# Patient Record
Sex: FEMALE | Race: WHITE | NOT HISPANIC OR LATINO | URBAN - METROPOLITAN AREA
[De-identification: names, ages, dates, MRNs, and addresses within clinical notes are randomized per-mention and may not be internally consistent; named-entity substitution may affect disease eponyms.]

---

## 2017-11-01 ENCOUNTER — APPOINTMENT (RX ONLY)
Dept: URBAN - METROPOLITAN AREA CLINIC 15 | Facility: CLINIC | Age: 42
Setting detail: DERMATOLOGY
End: 2017-11-01

## 2017-11-01 DIAGNOSIS — Z41.9 ENCOUNTER FOR PROCEDURE FOR PURPOSES OTHER THAN REMEDYING HEALTH STATE, UNSPECIFIED: ICD-10-CM

## 2017-11-01 PROBLEM — E78.5 HYPERLIPIDEMIA, UNSPECIFIED: Status: ACTIVE | Noted: 2017-11-01

## 2017-11-01 PROBLEM — F41.9 ANXIETY DISORDER, UNSPECIFIED: Status: ACTIVE | Noted: 2017-11-01

## 2017-11-01 PROBLEM — L55.1 SUNBURN OF SECOND DEGREE: Status: ACTIVE | Noted: 2017-11-01

## 2017-11-01 PROCEDURE — ? BOTOX

## 2017-11-01 PROCEDURE — ? ADDITIONAL NOTES

## 2017-11-01 ASSESSMENT — LOCATION DETAILED DESCRIPTION DERM
LOCATION DETAILED: LEFT SUPERIOR LATERAL MALAR CHEEK
LOCATION DETAILED: RIGHT FOREHEAD
LOCATION DETAILED: RIGHT SUPERIOR CENTRAL MALAR CHEEK
LOCATION DETAILED: LEFT FOREHEAD
LOCATION DETAILED: RIGHT SUPERIOR FOREHEAD
LOCATION DETAILED: INFERIOR MID FOREHEAD
LOCATION DETAILED: RIGHT LATERAL CANTHUS
LOCATION DETAILED: LEFT CENTRAL EYEBROW
LOCATION DETAILED: LEFT SUPERIOR CENTRAL MALAR CHEEK
LOCATION DETAILED: GLABELLA
LOCATION DETAILED: LEFT INFERIOR TEMPLE
LOCATION DETAILED: RIGHT INFERIOR FOREHEAD
LOCATION DETAILED: RIGHT LATERAL FOREHEAD
LOCATION DETAILED: LEFT LATERAL FOREHEAD
LOCATION DETAILED: LEFT MEDIAL FOREHEAD
LOCATION DETAILED: LEFT SUPERIOR LATERAL FOREHEAD

## 2017-11-01 ASSESSMENT — LOCATION ZONE DERM
LOCATION ZONE: EYELID
LOCATION ZONE: FACE

## 2017-11-01 ASSESSMENT — LOCATION SIMPLE DESCRIPTION DERM
LOCATION SIMPLE: GLABELLA
LOCATION SIMPLE: RIGHT FOREHEAD
LOCATION SIMPLE: RIGHT CHEEK
LOCATION SIMPLE: RIGHT EYELID
LOCATION SIMPLE: LEFT EYEBROW
LOCATION SIMPLE: LEFT TEMPLE
LOCATION SIMPLE: LEFT FOREHEAD
LOCATION SIMPLE: INFERIOR FOREHEAD
LOCATION SIMPLE: LEFT CHEEK

## 2017-11-01 NOTE — PROCEDURE: BOTOX
[place] Units: 0
Detail Level: Zone
Post-Care Instructions: Patient instructed to not lie down for 4 hours and limit physical activity for 24 hours. Patient instructed not to travel by airplane for 48 hours.
Consent: Written consent obtained. Risks include but not limited to lid/brow ptosis, bruising, swelling, diplopia, temporary effect, incomplete chemical denervation.
Lot #: [id number]
Forehead Units: 22.5
Dilution (U/0.1 Cc): 2.5
Expiration Date (Month Year): March 2020
Additional Area 1 Location: upper lip

## 2017-12-11 ENCOUNTER — APPOINTMENT (RX ONLY)
Dept: URBAN - METROPOLITAN AREA CLINIC 15 | Facility: CLINIC | Age: 42
Setting detail: DERMATOLOGY
End: 2017-12-11

## 2017-12-11 DIAGNOSIS — Z41.9 ENCOUNTER FOR PROCEDURE FOR PURPOSES OTHER THAN REMEDYING HEALTH STATE, UNSPECIFIED: ICD-10-CM

## 2017-12-11 PROCEDURE — ? ADDITIONAL NOTES

## 2017-12-11 PROCEDURE — ? BOTOX

## 2017-12-11 ASSESSMENT — LOCATION DETAILED DESCRIPTION DERM
LOCATION DETAILED: RIGHT FOREHEAD
LOCATION DETAILED: LEFT INFERIOR FOREHEAD
LOCATION DETAILED: RIGHT INFERIOR FOREHEAD
LOCATION DETAILED: RIGHT INFERIOR TEMPLE
LOCATION DETAILED: LEFT SUPERIOR LATERAL MALAR CHEEK
LOCATION DETAILED: LEFT MEDIAL EYEBROW
LOCATION DETAILED: LEFT INFERIOR TEMPLE
LOCATION DETAILED: GLABELLA
LOCATION DETAILED: LEFT LATERAL FOREHEAD
LOCATION DETAILED: RIGHT SUPERIOR LATERAL MALAR CHEEK

## 2017-12-11 ASSESSMENT — LOCATION SIMPLE DESCRIPTION DERM
LOCATION SIMPLE: RIGHT TEMPLE
LOCATION SIMPLE: LEFT EYEBROW
LOCATION SIMPLE: GLABELLA
LOCATION SIMPLE: RIGHT CHEEK
LOCATION SIMPLE: RIGHT FOREHEAD
LOCATION SIMPLE: LEFT FOREHEAD
LOCATION SIMPLE: LEFT CHEEK
LOCATION SIMPLE: LEFT TEMPLE

## 2017-12-11 ASSESSMENT — LOCATION ZONE DERM: LOCATION ZONE: FACE

## 2017-12-11 NOTE — PROCEDURE: BOTOX
Lateral Platysmal Bands Units: 0
Dilution (U/0.1 Cc): 2.5
Periorbital Skin Units: 10
Reconstitution Date (Optional): box 13/13 (0.6cc) box 2/11 (0.2cc)
Expiration Date (Month Year): 06/2020
Post-Care Instructions: Patient instructed to not lie down for 4 hours and limit physical activity for 24 hours. Patient instructed not to travel by airplane for 48 hours.
Consent: Written consent obtained. Risks include but not limited to lid/brow ptosis, bruising, swelling, diplopia, temporary effect, incomplete chemical denervation.
Lot #: [id number]
Additional Area 1 Location: upper lip
Detail Level: Zone

## 2018-07-09 ENCOUNTER — APPOINTMENT (RX ONLY)
Dept: URBAN - METROPOLITAN AREA CLINIC 15 | Facility: CLINIC | Age: 43
Setting detail: DERMATOLOGY
End: 2018-07-09

## 2018-07-09 DIAGNOSIS — Z41.9 ENCOUNTER FOR PROCEDURE FOR PURPOSES OTHER THAN REMEDYING HEALTH STATE, UNSPECIFIED: ICD-10-CM

## 2018-07-09 PROCEDURE — ? BOTOX

## 2018-07-09 PROCEDURE — ? ADDITIONAL NOTES

## 2018-07-09 PROCEDURE — ? FILLERS

## 2018-07-09 ASSESSMENT — LOCATION DETAILED DESCRIPTION DERM
LOCATION DETAILED: GLABELLA
LOCATION DETAILED: RIGHT INFERIOR TEMPLE
LOCATION DETAILED: RIGHT SUPERIOR MEDIAL BUCCAL CHEEK
LOCATION DETAILED: RIGHT INFERIOR FOREHEAD
LOCATION DETAILED: LEFT LATERAL FOREHEAD
LOCATION DETAILED: NASAL ROOT
LOCATION DETAILED: RIGHT SUPERIOR FOREHEAD
LOCATION DETAILED: LEFT ORAL COMMISSURE
LOCATION DETAILED: RIGHT LATERAL FOREHEAD
LOCATION DETAILED: RIGHT NASOLABIAL FOLD
LOCATION DETAILED: LEFT FOREHEAD
LOCATION DETAILED: RIGHT INFERIOR MEDIAL FOREHEAD
LOCATION DETAILED: RIGHT SUPERIOR CENTRAL MALAR CHEEK
LOCATION DETAILED: LEFT INFERIOR FOREHEAD
LOCATION DETAILED: RIGHT MID TEMPLE
LOCATION DETAILED: RIGHT MEDIAL FOREHEAD
LOCATION DETAILED: LEFT MID TEMPLE
LOCATION DETAILED: LEFT SUPERIOR CENTRAL MALAR CHEEK
LOCATION DETAILED: LEFT INFERIOR TEMPLE
LOCATION DETAILED: SUPERIOR MID FOREHEAD
LOCATION DETAILED: INFERIOR MID FOREHEAD
LOCATION DETAILED: LEFT SUPERIOR FOREHEAD
LOCATION DETAILED: LEFT UPPER CUTANEOUS LIP
LOCATION DETAILED: RIGHT UPPER CUTANEOUS LIP

## 2018-07-09 ASSESSMENT — LOCATION ZONE DERM
LOCATION ZONE: LIP
LOCATION ZONE: NOSE
LOCATION ZONE: FACE

## 2018-07-09 ASSESSMENT — LOCATION SIMPLE DESCRIPTION DERM
LOCATION SIMPLE: INFERIOR FOREHEAD
LOCATION SIMPLE: LEFT CHEEK
LOCATION SIMPLE: RIGHT LIP
LOCATION SIMPLE: SUPERIOR FOREHEAD
LOCATION SIMPLE: RIGHT TEMPLE
LOCATION SIMPLE: RIGHT FOREHEAD
LOCATION SIMPLE: LEFT LIP
LOCATION SIMPLE: RIGHT CHEEK
LOCATION SIMPLE: GLABELLA
LOCATION SIMPLE: LEFT TEMPLE
LOCATION SIMPLE: LEFT FOREHEAD
LOCATION SIMPLE: NOSE

## 2018-07-09 NOTE — PROCEDURE: FILLERS
Decollete Filler  Volume In Cc: 0
Consent: Written consent obtained. Risks include but not limited to bruising, beading, irregular texture, ulceration, infection, allergic reaction, scar formation, incomplete augmentation, temporary nature, procedural pain.
Use Map Statement For Sites (Optional): No
Expiration Date (Month Year): 2019/07/28
Additional Anesthesia Volume In Cc: 6
Detail Level: Zone
Lot #: [id number]
Additional Area 1 Location: lips
Filler: Juvederm Ultra
Map Statment: See [address] for Complete Details
Lot #: [id number]
Anesthesia Volume In Cc: 0.5
Post-Care Instructions: Patient instructed to apply ice to reduce swelling.
Nasolabial Folds Filler Volume In Cc: 1

## 2018-07-09 NOTE — PROCEDURE: BOTOX
Glabellar Complex Units: 17.5
Lateral Platysmal Bands Units: 0
Lot #: [id number]
Consent: Written consent obtained. Risks include but not limited to lid/brow ptosis, bruising, swelling, diplopia, temporary effect, incomplete chemical denervation.
Periorbital Skin Units: [address]
Post-Care Instructions: Patient instructed to not lie down for 4 hours and limit physical activity for 24 hours. Patient instructed not to travel by airplane for 48 hours.
Additional Area 1 Location: chin
Price (Use Numbers Only, No Special Characters Or $): [address]
Forehead Units: 20
Expiration Date (Month Year): 12/2020
Detail Level: Zone
Dilution (U/0.1 Cc): 4.4

## 2018-07-09 NOTE — HPI: FREE FORM (INITIAL HISTORY)
What Brings You In Today? (This Is An Xx Year Old Patient Who Presents For...): Fillers
What Brings You In Today? (This Is An Xx Year Old Patient Who Presents For...): Botox

## 2019-03-27 ENCOUNTER — APPOINTMENT (RX ONLY)
Dept: URBAN - METROPOLITAN AREA CLINIC 15 | Facility: CLINIC | Age: 44
Setting detail: DERMATOLOGY
End: 2019-03-27

## 2019-03-27 DIAGNOSIS — Z41.9 ENCOUNTER FOR PROCEDURE FOR PURPOSES OTHER THAN REMEDYING HEALTH STATE, UNSPECIFIED: ICD-10-CM

## 2019-03-27 PROCEDURE — ? BOTOX

## 2019-03-27 PROCEDURE — ? ADDITIONAL NOTES

## 2019-03-27 NOTE — HPI: COSMETIC (BOTOX)
Have You Had Botox Before?: has had botox
Additional History: Patient is here to have three areas of Botox done.

## 2019-03-27 NOTE — PROCEDURE: BOTOX
Anterior Platysmal Bands Units: 0
Consent: Written consent obtained. Risks include but not limited to lid/brow ptosis, bruising, swelling, diplopia, temporary effect, incomplete chemical denervation.
Post-Care Instructions: Patient instructed to not lie down for 4 hours and limit physical activity for 24 hours. Patient instructed not to travel by airplane for 48 hours.
Periorbital Skin Units: 25
Forehead Units: 15
Expiration Date (Month Year): 08/2021
Lot #: [id number]
Detail Level: Zone
Dilution (U/0.1 Cc): 4

## 2019-04-16 ENCOUNTER — APPOINTMENT (RX ONLY)
Dept: URBAN - METROPOLITAN AREA CLINIC 15 | Facility: CLINIC | Age: 44
Setting detail: DERMATOLOGY
End: 2019-04-16

## 2019-04-16 DIAGNOSIS — Z41.9 ENCOUNTER FOR PROCEDURE FOR PURPOSES OTHER THAN REMEDYING HEALTH STATE, UNSPECIFIED: ICD-10-CM

## 2019-04-16 PROCEDURE — ? FILLERS

## 2019-04-16 PROCEDURE — ? DYSPORT

## 2019-04-16 ASSESSMENT — LOCATION DETAILED DESCRIPTION DERM
LOCATION DETAILED: RIGHT MEDIAL MALAR CHEEK
LOCATION DETAILED: LEFT SUPERIOR LATERAL MALAR CHEEK
LOCATION DETAILED: RIGHT SUPERIOR LATERAL MALAR CHEEK
LOCATION DETAILED: RIGHT INFERIOR TEMPLE
LOCATION DETAILED: LEFT MID TEMPLE
LOCATION DETAILED: LEFT CENTRAL MALAR CHEEK

## 2019-04-16 ASSESSMENT — LOCATION SIMPLE DESCRIPTION DERM
LOCATION SIMPLE: LEFT CHEEK
LOCATION SIMPLE: RIGHT CHEEK
LOCATION SIMPLE: LEFT TEMPLE
LOCATION SIMPLE: RIGHT TEMPLE

## 2019-04-16 ASSESSMENT — LOCATION ZONE DERM: LOCATION ZONE: FACE

## 2019-04-16 NOTE — PROCEDURE: DYSPORT
Nasal Root Units: 0
Detail Level: Simple
Post-Care Instructions: Patient instructed to not lie down for 4 hours and limit physical activity for 24 hours. Patient instructed not to travel by airplane for 48 hours.
Price (Use Numbers Only, No Special Characters Or $): [address]
Periorbital Skin Units: [address]
Consent: Written consent obtained. Risks include but not limited to lid/brow ptosis, bruising, swelling, diplopia, temporary effect, incomplete chemical denervation.
Expiration Date (Month Year): 09/30/2019
Dilution (U/ 0.1cc): 3
Lot #: [id number]

## 2019-04-16 NOTE — HPI: COSMETIC (DYSPORT)
Have You Had Dysport Before?: has had dysport
Additional History: Patient had Botox on 3/2019 with improvement. She states there are still a few lines when she smiles.
When Was Your Last Dysport Treatment?: 2017

## 2019-04-16 NOTE — PROCEDURE: FILLERS
Cheeks Filler Volume In Cc: 0
Expiration Date (Month Year): 2019-12-23
Include Cannula Information In Note?: No
Expiration Date (Month Year): 2020-12-21
Additional Anesthesia Volume In Cc: 6
Additional Area 1 Location: lips
Topical Anesthesia?: BLT cream (benzocaine 20%, lidocaine 6%, tetracaine 4%)
Detail Level: Zone
Filler: Jovanny Tapia
Filler: Restylane Defyne
Cheeks Filler Volume In Cc: 2
Consent: Written consent obtained. Risks include but not limited to bruising, beading, irregular texture, ulceration, infection, allergic reaction, scar formation, incomplete augmentation, temporary nature, procedural pain.
Post-Care Instructions: Patient instructed to apply ice to reduce swelling.
Jawline Filler Volume In Cc: 0.6
Lot #: [id number]
Nasolabial Folds Filler Volume In Cc: 0.2
Lot #: [id number]
Map Statment: See [address] for Complete Details

## 2020-08-28 ENCOUNTER — TRANSCRIBE ORDERS (OUTPATIENT)
Dept: PHYSICAL THERAPY | Facility: MEDICAL CENTER | Age: 45
End: 2020-08-28

## 2020-08-28 ENCOUNTER — EVALUATION (OUTPATIENT)
Dept: PHYSICAL THERAPY | Facility: MEDICAL CENTER | Age: 45
End: 2020-08-28
Payer: COMMERCIAL

## 2020-08-28 DIAGNOSIS — M25.511 RIGHT SHOULDER PAIN, UNSPECIFIED CHRONICITY: Primary | ICD-10-CM

## 2020-08-28 PROCEDURE — 97161 PT EVAL LOW COMPLEX 20 MIN: CPT | Performed by: PHYSICAL THERAPIST

## 2020-08-28 NOTE — PROGRESS NOTES
PT Evaluation     Today's date: 2020  Patient name: Darrell Cobb  : 1975  MRN: [id number]  Referring provider: Meryl Mccall MD  Dx:   Encounter Diagnosis     ICD-10-CM    1  Right shoulder pain, unspecified chronicity  M25 511                   Assessment  Assessment details: Pt is a pleasant 49 yo presenting to physical therapy with R shoulder pain  Pt would benefit from skilled PT to address current impairments and maximize pts function  Understanding of Dx/Px/POC: good   Prognosis: good    Goals  Impairment Goals  - Pt I with initial HEP in 1-2 visits  - Improve ST to normal B in 4-6 weeks  - Increase strength to 5/5 in all affected areas in 4-6 weeks    Functional Goals  - Increase FOTO to at least 66 in 6-8 weeks  - Patient will be independent with comprehensive HEP in 6-8 weeks  - Patient will be able to reach for object overhead with min to difficulty in 6-8 weeks  - Patient will be able to lift/carry objects without provocation of symptoms in 6-8 weeks          Plan  Patient would benefit from: skilled physical therapy  Other planned modality interventions: Modalities prn  Planned therapy interventions: manual therapy, neuromuscular re-education, patient education, strengthening, stretching, therapeutic activities, therapeutic exercise and home exercise program  Frequency: 2x week  Duration in weeks: 8  Treatment plan discussed with: patient        Subjective Evaluation    History of Present Illness  Mechanism of injury: Pt reports that she first noticed R shoulder pain in December  She states that the pain was noticeable when she woke up  Pt notes that the pain was in her R upper arm  Two-three weeks ago she woke up with intense pain and could not move her arm  She had an injection and that helped significantly      Pain  Current pain ratin  At best pain ratin  At worst pain rating: 10    Social Support    Employment status: working (Computer work)  Exercise history: jogging      Diagnostic Tests  X-ray: abnormal  Patient Goals  Patient goals for therapy: decreased pain, return to sport/leisure activities and increased strength          Objective     Postural Observations    Additional Postural Observation Details  + scapular protraction, anterior tilting and winging B   R > L    Palpation     Additional Palpation Details  + TTP B UT    Cervical/Thoracic Screen   Cervical range of motion within normal limits  Thoracic range of motion within normal limits    Neurological Testing     Sensation     Shoulder   Left Shoulder   Intact: light touch    Right Shoulder   Intact: light touch    Active Range of Motion     Additional Active Range of Motion Details  Pt demonstrates full AROM B shoulder flexion and abduction, but demonstrates significant dyskinesis with these movements B, R worse than L  Strength/Myotome Testing     Left Shoulder     Planes of Motion   Flexion: 5   Abduction: 5   External rotation at 0°: 5   Internal rotation at 0°: 5     Right Shoulder     Planes of Motion   Flexion: 5   Abduction: 5   External rotation at 0°: 5   Internal rotation at 0°: 5     Additional Strength Details  Prone horizontal abduction with palm down: R 3/5 L 3/5  Prone horizontal abduction with thumb up: R 3/5 L 3/5  Prone flexion: R 3/5 L 3/5    Elbow flexion and extension: 5/5 B    Scaption and empty can: 5/5 B    Tests     Left Shoulder   Positive scapular relocation   Right Shoulder   Positive scapular relocation         Flowsheet Rows      Most Recent Value   PT/OT G-Codes   Current Score  34   Projected Score  66             Precautions: None      Ther ex 8/28            UBE NV            Scap 4 IP            UE alpha NV            SL ER NV            Prone pro/ret NV            Wall slides NV

## 2020-08-28 NOTE — LETTER
2020    Lynda Orta MD  [facility] [address] [place] [address]    Patient: Fam Rodríguez   YOB: 1975   Date of Visit: 2020     Encounter Diagnosis     ICD-10-CM    1  Right shoulder pain, unspecified chronicity  M25 511        Dear Dr Angela Pérez: Thank you for your recent referral of Fam Rodríguez  Please review the attached evaluation summary from Cherie's recent visit  Please verify that you agree with the plan of care by signing the attached order  If you have any questions or concerns, please do not hesitate to call  I sincerely appreciate the opportunity to share in the care of one of your patients and hope to have another opportunity to work with you in the near future  Sincerely,    Nya Candelaria, PT      Referring Provider:      I certify that I have read the below Plan of Care and certify the need for these services furnished under this plan of treatment while under my care  Lynda Orta MD  [facility]: [phone]          PT Evaluation     Today's date: 2020  Patient name: Fam Rodríguez  : 1975  MRN: [id number]  Referring provider: Kelsi Lo MD  Dx:   Encounter Diagnosis     ICD-10-CM    1  Right shoulder pain, unspecified chronicity  M25 511                   Assessment  Assessment details: Pt is a pleasant 51 yo presenting to physical therapy with R shoulder pain  Pt would benefit from skilled PT to address current impairments and maximize pts function      Understanding of Dx/Px/POC: good   Prognosis: good    Goals  Impairment Goals  - Pt I with initial HEP in 1-2 visits  - Improve ST to normal B in 4-6 weeks  - Increase strength to 5/5 in all affected areas in 4-6 weeks    Functional Goals  - Increase FOTO to at least 66 in 6-8 weeks  - Patient will be independent with comprehensive HEP in 6-8 weeks  - Patient will be able to reach for object overhead with min to difficulty in 6-8 weeks  - Patient will be able to lift/carry objects without provocation of symptoms in 6-8 weeks          Plan  Patient would benefit from: skilled physical therapy  Other planned modality interventions: Modalities prn  Planned therapy interventions: manual therapy, neuromuscular re-education, patient education, strengthening, stretching, therapeutic activities, therapeutic exercise and home exercise program  Frequency: 2x week  Duration in weeks: 8  Treatment plan discussed with: patient        Subjective Evaluation    History of Present Illness  Mechanism of injury: Pt reports that she first noticed R shoulder pain in December  She states that the pain was noticeable when she woke up  Pt notes that the pain was in her R upper arm  Two-three weeks ago she woke up with intense pain and could not move her arm  She had an injection and that helped significantly      Pain  Current pain ratin  At best pain ratin  At worst pain rating: 10    Social Support    Employment status: working (Computer work)  Exercise history: jogging      Diagnostic Tests  X-ray: abnormal  Patient Goals  Patient goals for therapy: decreased pain, return to sport/leisure activities and increased strength          Objective     Postural Observations    Additional Postural Observation Details  + scapular protraction, anterior tilting and winging B   R > L    Palpation     Additional Palpation Details  + TTP B UT    Cervical/Thoracic Screen   Cervical range of motion within normal limits  Thoracic range of motion within normal limits    Neurological Testing     Sensation     Shoulder   Left Shoulder   Intact: light touch    Right Shoulder   Intact: light touch    Active Range of Motion     Additional Active Range of Motion Details  Pt demonstrates full AROM B shoulder flexion and abduction, but demonstrates significant dyskinesis with these movements B, R worse than L     Strength/Myotome Testing     Left Shoulder     Planes of Motion   Flexion: 5   Abduction: 5   External rotation at 0°: 5   Internal rotation at 0°: 5     Right Shoulder     Planes of Motion   Flexion: 5   Abduction: 5   External rotation at 0°: 5   Internal rotation at 0°: 5     Additional Strength Details  Prone horizontal abduction with palm down: R 3/5 L 3/5  Prone horizontal abduction with thumb up: R 3/5 L 3/5  Prone flexion: R 3/5 L 3/5    Elbow flexion and extension: 5/5 B    Scaption and empty can: 5/5 B    Tests     Left Shoulder   Positive scapular relocation   Right Shoulder   Positive scapular relocation         Flowsheet Rows      Most Recent Value   PT/OT G-Codes   Current Score  34   Projected Score  66             Precautions: None      Ther ex 8/28            UBE NV            Scap 4 IP            UE alpha NV            SL ER NV            Prone pro/ret NV            Wall slides NV

## 2020-09-04 ENCOUNTER — OFFICE VISIT (OUTPATIENT)
Dept: PHYSICAL THERAPY | Facility: MEDICAL CENTER | Age: 45
End: 2020-09-04
Payer: COMMERCIAL

## 2020-09-04 DIAGNOSIS — M25.511 RIGHT SHOULDER PAIN, UNSPECIFIED CHRONICITY: Primary | ICD-10-CM

## 2020-09-04 PROCEDURE — 97110 THERAPEUTIC EXERCISES: CPT

## 2020-09-04 NOTE — PROGRESS NOTES
Daily Note     Today's date: 2020  Patient name: Nimesh Jaquez  : 1975  MRN: [id number]  Referring provider: Diane Carpenter MD  Dx:   Encounter Diagnosis     ICD-10-CM    1  Right shoulder pain, unspecified chronicity  M25 511                   Subjective: Pt reports that "I do not know what I am doing and do not know what I should be feeling"  Objective: See treatment diary below      Assessment: Tolerated treatment fairly well  Patient displays poor awareness of scapular mm activation and correct posture throughout scap 4 ex's  Pt required significant cuing for correct mm activation and to avoid compensatory patterns  Pt fatigued at end of session and was advised to focus on scap 4 ex's and supine alphabet for her hep at this time      Plan: Continue per plan of care        Precautions: None      Ther ex            UBE NV 3F/3B           Scap 4 IP 5"  3x10           UE alpha NV 2x  Sup             SL ER NV 3x10           Prone pro/ret NV 3x10           Wall slides NV 2x10  sup

## 2020-09-11 ENCOUNTER — OFFICE VISIT (OUTPATIENT)
Dept: PHYSICAL THERAPY | Facility: MEDICAL CENTER | Age: 45
End: 2020-09-11
Payer: COMMERCIAL

## 2020-09-11 DIAGNOSIS — M25.511 RIGHT SHOULDER PAIN, UNSPECIFIED CHRONICITY: Primary | ICD-10-CM

## 2020-09-11 PROCEDURE — 97110 THERAPEUTIC EXERCISES: CPT | Performed by: PHYSICAL THERAPIST

## 2020-09-11 NOTE — PROGRESS NOTES
Daily Note     Today's date: 2020  Patient name: Bony Mcbride  : 1975  MRN: [id number]  Referring provider: Dai Ling MD  Dx:   Encounter Diagnosis     ICD-10-CM    1  Right shoulder pain, unspecified chronicity  M25 511             Subjective: Pt reports that her shoulder is still feeling good because of the shot  She is having a hard time coordinating the exercises  Objective: See treatment diary below    Exercises modified today, so that the patient can coordinate them more easily  Assessment: Tolerated treatment well  Patient demonstrated fatigue post treatment and would benefit from continued PT  Pt did very well today with exercise modification and additional cuing  Plan: Continue per plan of care        Precautions: None      Ther ex  9          UBE NV 3F/3B 3F/3B          Scap 4 IP 5"  3x10 NP          UE alpha NV 2x  Sup   2X  sup          SL ER NV 3x10 x30          Prone pro/ret NV 3x10 NP          Wall slides NV 2x10  sup NP          Seated scap sq   x30          Robbery scap sq-seated   x30          Seated lawnmowers   x30          Standing low rows   x30

## 2020-09-18 ENCOUNTER — OFFICE VISIT (OUTPATIENT)
Dept: PHYSICAL THERAPY | Facility: MEDICAL CENTER | Age: 45
End: 2020-09-18
Payer: COMMERCIAL

## 2020-09-18 DIAGNOSIS — M25.511 RIGHT SHOULDER PAIN, UNSPECIFIED CHRONICITY: Primary | ICD-10-CM

## 2020-09-18 PROCEDURE — 97110 THERAPEUTIC EXERCISES: CPT | Performed by: PHYSICAL THERAPIST

## 2020-09-18 NOTE — PROGRESS NOTES
Daily Note     Today's date: 2020  Patient name: Bibiana Waldron  : 1975  MRN: [id number]  Referring provider: Ekta Mccloud MD  Dx:   Encounter Diagnosis     ICD-10-CM    1  Right shoulder pain, unspecified chronicity  M25 511        Subjective:  Pt reports that she is better able to do her exercises since last visit  Pt states that she is not having any pain on a regular basis  Objective: See treatment diary below      Assessment: Tolerated treatment well  Patient demonstrated fatigue post treatment, exhibited good technique with therapeutic exercises and would benefit from continued PT  Exercise technique is much improved  Plan: Continue per plan of care        Precautions: None      Ther ex          UBE NV 3F/3B 3F/3B 3F/3B         Scap 4 IP 5"  3x10 NP See below         UE alpha NV 2x  Sup   2X  sup 2X  sup         SL ER NV 3x10 x30 1#  3x10         Prone pro/ret NV 3x10 NP          Wall slides NV 2x10  sup NP          Seated scap sq   x30 Red  3x10         Robbery scap sq-seated   x30 Red  3x10         Seated lawnmowers   x30 Red  3x10         Standing low rows   x30 Red  3x10

## 2020-09-25 ENCOUNTER — OFFICE VISIT (OUTPATIENT)
Dept: PHYSICAL THERAPY | Facility: MEDICAL CENTER | Age: 45
End: 2020-09-25
Payer: COMMERCIAL

## 2020-09-25 DIAGNOSIS — M25.511 RIGHT SHOULDER PAIN, UNSPECIFIED CHRONICITY: Primary | ICD-10-CM

## 2020-09-25 PROCEDURE — 97110 THERAPEUTIC EXERCISES: CPT

## 2020-09-25 NOTE — PROGRESS NOTES
Daily Note     Today's date: 2020  Patient name: Kylee Rodriguez  : 1975  MRN: [id number]  Referring provider: Chuck Jarrett MD  Dx:   Encounter Diagnosis     ICD-10-CM    1  Right shoulder pain, unspecified chronicity  M25 511                   Subjective: Pt reports that her shoulder is feeling much better and has been faithfully practicing her hep  Objective: See treatment diary below      Assessment: Tolerated treatment well  Patient demonstrated fatigue post treatment, exhibited good technique with therapeutic exercises and would benefit from continued PT  Pt's scapular mm activation has improved a great deal and has more awareness of how to engage these mm's  Plan: Continue per plan of care        Precautions: None      Ther ex         UBE NV 3F/3B 3F/3B 3F/3B 3F/3B        Scap 4 IP 5"  3x10 NP See below See  below        UE alpha NV 2x  Sup   2X  sup 2X  sup 3x  sup        SL ER NV 3x10 x30 1#  3x10 1#  3x12        Prone pro/ret NV 3x10 NP  3x10        Wall slides NV 2x10  sup NP  NP        Seated scap sq   x30 Red  3x10 Stand  Red  3x12        Robbery scap sq-seated   x30 Red  3x10 Stand  Red  3x12        Seated lawnmowers   x30 Red  3x10 Stand  Red  3x12        Standing low rows   x30 Red  3x10 Stand  Red  3x12

## 2021-01-23 DIAGNOSIS — Z23 ENCOUNTER FOR IMMUNIZATION: ICD-10-CM

## 2021-01-28 ENCOUNTER — IMMUNIZATIONS (OUTPATIENT)
Dept: FAMILY MEDICINE CLINIC | Facility: HOSPITAL | Age: 46
End: 2021-01-28

## 2021-01-28 DIAGNOSIS — Z23 ENCOUNTER FOR IMMUNIZATION: Primary | ICD-10-CM

## 2021-01-28 PROCEDURE — 91300 SARS-COV-2 / COVID-19 MRNA VACCINE (PFIZER-BIONTECH) 30 MCG: CPT

## 2021-01-28 PROCEDURE — 0001A SARS-COV-2 / COVID-19 MRNA VACCINE (PFIZER-BIONTECH) 30 MCG: CPT

## 2021-02-17 ENCOUNTER — IMMUNIZATIONS (OUTPATIENT)
Dept: FAMILY MEDICINE CLINIC | Facility: HOSPITAL | Age: 46
End: 2021-02-17

## 2021-02-17 DIAGNOSIS — Z23 ENCOUNTER FOR IMMUNIZATION: Primary | ICD-10-CM

## 2021-02-17 PROCEDURE — 91300 SARS-COV-2 / COVID-19 MRNA VACCINE (PFIZER-BIONTECH) 30 MCG: CPT

## 2021-02-17 PROCEDURE — 0002A SARS-COV-2 / COVID-19 MRNA VACCINE (PFIZER-BIONTECH) 30 MCG: CPT

## 2021-04-26 ENCOUNTER — APPOINTMENT (RX ONLY)
Dept: URBAN - METROPOLITAN AREA CLINIC 15 | Facility: CLINIC | Age: 46
Setting detail: DERMATOLOGY
End: 2021-04-26

## 2021-04-26 DIAGNOSIS — Z41.9 ENCOUNTER FOR PROCEDURE FOR PURPOSES OTHER THAN REMEDYING HEALTH STATE, UNSPECIFIED: ICD-10-CM

## 2021-04-26 PROCEDURE — ? ADDITIONAL NOTES

## 2021-04-26 PROCEDURE — ? OTHER

## 2021-04-26 PROCEDURE — ? VI PEEL

## 2021-04-26 PROCEDURE — ? DYSPORT

## 2021-04-26 ASSESSMENT — LOCATION DETAILED DESCRIPTION DERM
LOCATION DETAILED: RIGHT SUPERIOR LATERAL MALAR CHEEK
LOCATION DETAILED: RIGHT INFERIOR CENTRAL MALAR CHEEK
LOCATION DETAILED: LEFT SUPERIOR LATERAL MALAR CHEEK
LOCATION DETAILED: RIGHT INFERIOR MEDIAL FOREHEAD
LOCATION DETAILED: LEFT INFERIOR CENTRAL MALAR CHEEK
LOCATION DETAILED: INFERIOR MID FOREHEAD
LOCATION DETAILED: LEFT INFERIOR LATERAL FOREHEAD
LOCATION DETAILED: GLABELLA
LOCATION DETAILED: RIGHT INFERIOR LATERAL FOREHEAD

## 2021-04-26 ASSESSMENT — LOCATION SIMPLE DESCRIPTION DERM
LOCATION SIMPLE: INFERIOR FOREHEAD
LOCATION SIMPLE: RIGHT CHEEK
LOCATION SIMPLE: GLABELLA
LOCATION SIMPLE: RIGHT FOREHEAD
LOCATION SIMPLE: LEFT FOREHEAD
LOCATION SIMPLE: LEFT CHEEK

## 2021-04-26 ASSESSMENT — LOCATION ZONE DERM: LOCATION ZONE: FACE

## 2021-04-26 NOTE — PROCEDURE: DYSPORT
Additional Area 2 Units: 0
Show Right And Left Pupillary Line Units: No
Show Additional Area 3: Yes
Consent: Written consent obtained. Risks include but not limited to lid/brow ptosis, bruising, swelling, diplopia, temporary effect, incomplete chemical denervation.
Detail Level: Zone
Glabellar Complex Units: [address] [facility]
Expiration Date (Month Year): 11/30/2021
Additional Area 1 Location: [facility]
Lot #: [id number]
Additional Area 2 Location: [facility]
Forehead Units: [address]
Post-Care Instructions: Patient instructed to not lie down for 4 hours and limit physical activity for 24 hours. Patient instructed not to travel by airplane for 48 hours.
Price (Use Numbers Only, No Special Characters Or $): [address]
Dilution (U/ 0.1cc): 3
Periorbital Skin Units: 100

## 2021-04-26 NOTE — PROCEDURE: OTHER
Note Text (......Xxx Chief Complaint.): This diagnosis correlates with the
Other (Free Text): .\\n\\nBox 13/2 20 units \\n\\nBox 12/2 190 units\\n\\nTotal 210 units
Render Risk Assessment In Note?: no
Detail Level: Zone

## 2021-04-26 NOTE — PROCEDURE: VI PEEL
Post-Care Instructions: I reviewed with the patient in detail post-care instructions. Patient should avoid sun exposure and wear sun protection.
Treatment Number: 1
Consent: Prior to the procedure, written consent was obtained and risks were reviewed, including but not limited to: redness, peeling, blistering, pigmentary change, scarring, infection, and pain. Patient is aware multiple treatments may be necessary to achieve the desired outcome.
Prep: The treated area was degreased with pre-peel cleanser, and vaseline was applied for protection of mucous membranes.
Price (Use Numbers Only, No Special Characters Or $[facility]
Chemical Peel: VI Precision
Detail Level: Zone
Post Peel Care: After the procedure, the patient was instructed not to wash the treated area for 6-8 hours or manually remove dead skin when the peeling process starts. Patient may use OTC hydrocortisone cream for itching. Patient instructed to use the provided Retin-A wipes on the treated area on the 1st and 2nd nights.

## 2021-04-26 NOTE — PROCEDURE: ADDITIONAL NOTES
Detail Level: Detailed
Additional Notes: .\\n\\nLot: [id number] \\nExp: 12/2022\\n\\nPatient was provided with post chemical peel instructions. Wash treatment off at 2:30 Pm today 4/26/21.
Render Risk Assessment In Note?: no

## 2021-10-02 ENCOUNTER — IMMUNIZATIONS (OUTPATIENT)
Dept: FAMILY MEDICINE CLINIC | Facility: HOSPITAL | Age: 46
End: 2021-10-02

## 2021-10-02 DIAGNOSIS — Z23 ENCOUNTER FOR IMMUNIZATION: Primary | ICD-10-CM

## 2021-10-02 PROCEDURE — 91300 SARS-COV-2 / COVID-19 MRNA VACCINE (PFIZER-BIONTECH) 30 MCG: CPT

## 2021-10-02 PROCEDURE — 0001A SARS-COV-2 / COVID-19 MRNA VACCINE (PFIZER-BIONTECH) 30 MCG: CPT

## 2022-04-08 ENCOUNTER — APPOINTMENT (RX ONLY)
Dept: URBAN - METROPOLITAN AREA CLINIC 15 | Facility: CLINIC | Age: 47
Setting detail: DERMATOLOGY
End: 2022-04-08

## 2022-04-08 VITALS — WEIGHT: 155 LBS | HEIGHT: 66 IN

## 2022-04-08 DIAGNOSIS — Z41.9 ENCOUNTER FOR PROCEDURE FOR PURPOSES OTHER THAN REMEDYING HEALTH STATE, UNSPECIFIED: ICD-10-CM

## 2022-04-08 PROCEDURE — ? DYSPORT

## 2022-04-08 ASSESSMENT — LOCATION DETAILED DESCRIPTION DERM
LOCATION DETAILED: RIGHT SUPERIOR LATERAL MALAR CHEEK
LOCATION DETAILED: LEFT INFERIOR LATERAL FOREHEAD
LOCATION DETAILED: GLABELLA
LOCATION DETAILED: LEFT SUPERIOR LATERAL MALAR CHEEK
LOCATION DETAILED: RIGHT INFERIOR LATERAL FOREHEAD
LOCATION DETAILED: INFERIOR MID FOREHEAD

## 2022-04-08 ASSESSMENT — LOCATION SIMPLE DESCRIPTION DERM
LOCATION SIMPLE: RIGHT FOREHEAD
LOCATION SIMPLE: LEFT CHEEK
LOCATION SIMPLE: LEFT FOREHEAD
LOCATION SIMPLE: GLABELLA
LOCATION SIMPLE: INFERIOR FOREHEAD
LOCATION SIMPLE: RIGHT CHEEK

## 2022-04-08 ASSESSMENT — LOCATION ZONE DERM: LOCATION ZONE: FACE

## 2022-04-08 NOTE — HPI: COSMETIC (DYSPORT)
Have You Had Dysport Before?: has had dysport
Additional History: Dysport Injection Sites:\\nForehead - 40 units\\nPeriorbital Skin - 100 units\\nGlabellar Complex - 70 units
When Was Your Last Dysport Treatment?: 4/26/21

## 2022-04-08 NOTE — PROCEDURE: DYSPORT
Additional Area 2 Units: 0
Show Right And Left Pupillary Line Units: No
Show Additional Area 3: Yes
Consent: Written consent obtained. Risks include but not limited to lid/brow ptosis, bruising, swelling, diplopia, temporary effect, incomplete chemical denervation.
Detail Level: Zone
Glabellar Complex Units: 100
Expiration Date (Month Year): 12/31/20022
Additional Area 1 Location: [facility]
Additional Comments: box 8/2 200 box 9/2 40 units total 240 units
Lot #: [id number]
Additional Area 2 Location: [facility]
Forehead Units: [address]
Post-Care Instructions: Patient instructed to not lie down for 4 hours and limit physical activity for 24 hours. Patient instructed not to travel by airplane for 48 hours.
Price (Use Numbers Only, No Special Characters Or $): [address]
Dilution (U/ 0.1cc): 3